# Patient Record
Sex: MALE | Race: OTHER | NOT HISPANIC OR LATINO | URBAN - METROPOLITAN AREA
[De-identification: names, ages, dates, MRNs, and addresses within clinical notes are randomized per-mention and may not be internally consistent; named-entity substitution may affect disease eponyms.]

---

## 2022-05-24 ENCOUNTER — EMERGENCY (EMERGENCY)
Facility: HOSPITAL | Age: 42
LOS: 1 days | Discharge: HOME | End: 2022-05-24
Attending: EMERGENCY MEDICINE
Payer: SELF-PAY

## 2022-05-24 VITALS
TEMPERATURE: 98 F | HEIGHT: 69 IN | WEIGHT: 199.96 LBS | OXYGEN SATURATION: 97 % | DIASTOLIC BLOOD PRESSURE: 76 MMHG | SYSTOLIC BLOOD PRESSURE: 122 MMHG | HEART RATE: 65 BPM | RESPIRATION RATE: 16 BRPM

## 2022-05-24 LAB
ALBUMIN SERPL ELPH-MCNC: 3.8 G/DL — SIGNIFICANT CHANGE UP (ref 3.5–5)
ALP SERPL-CCNC: 49 U/L — SIGNIFICANT CHANGE UP (ref 40–120)
ALT FLD-CCNC: 27 U/L DA — SIGNIFICANT CHANGE UP (ref 10–60)
ANION GAP SERPL CALC-SCNC: 5 MMOL/L — SIGNIFICANT CHANGE UP (ref 5–17)
AST SERPL-CCNC: 17 U/L — SIGNIFICANT CHANGE UP (ref 10–40)
BASOPHILS # BLD AUTO: 0.03 K/UL — SIGNIFICANT CHANGE UP (ref 0–0.2)
BASOPHILS NFR BLD AUTO: 0.4 % — SIGNIFICANT CHANGE UP (ref 0–2)
BILIRUB SERPL-MCNC: 0.4 MG/DL — SIGNIFICANT CHANGE UP (ref 0.2–1.2)
BUN SERPL-MCNC: 15 MG/DL — SIGNIFICANT CHANGE UP (ref 7–18)
CALCIUM SERPL-MCNC: 9 MG/DL — SIGNIFICANT CHANGE UP (ref 8.4–10.5)
CHLORIDE SERPL-SCNC: 109 MMOL/L — HIGH (ref 96–108)
CO2 SERPL-SCNC: 26 MMOL/L — SIGNIFICANT CHANGE UP (ref 22–31)
CREAT SERPL-MCNC: 0.81 MG/DL — SIGNIFICANT CHANGE UP (ref 0.5–1.3)
EGFR: 115 ML/MIN/1.73M2 — SIGNIFICANT CHANGE UP
EOSINOPHIL # BLD AUTO: 0.35 K/UL — SIGNIFICANT CHANGE UP (ref 0–0.5)
EOSINOPHIL NFR BLD AUTO: 4.9 % — SIGNIFICANT CHANGE UP (ref 0–6)
GLUCOSE SERPL-MCNC: 104 MG/DL — HIGH (ref 70–99)
HCT VFR BLD CALC: 42.7 % — SIGNIFICANT CHANGE UP (ref 39–50)
HGB BLD-MCNC: 14.3 G/DL — SIGNIFICANT CHANGE UP (ref 13–17)
IMM GRANULOCYTES NFR BLD AUTO: 0.3 % — SIGNIFICANT CHANGE UP (ref 0–1.5)
LYMPHOCYTES # BLD AUTO: 2.2 K/UL — SIGNIFICANT CHANGE UP (ref 1–3.3)
LYMPHOCYTES # BLD AUTO: 31 % — SIGNIFICANT CHANGE UP (ref 13–44)
MCHC RBC-ENTMCNC: 30 PG — SIGNIFICANT CHANGE UP (ref 27–34)
MCHC RBC-ENTMCNC: 33.5 GM/DL — SIGNIFICANT CHANGE UP (ref 32–36)
MCV RBC AUTO: 89.7 FL — SIGNIFICANT CHANGE UP (ref 80–100)
MONOCYTES # BLD AUTO: 0.71 K/UL — SIGNIFICANT CHANGE UP (ref 0–0.9)
MONOCYTES NFR BLD AUTO: 10 % — SIGNIFICANT CHANGE UP (ref 2–14)
NEUTROPHILS # BLD AUTO: 3.78 K/UL — SIGNIFICANT CHANGE UP (ref 1.8–7.4)
NEUTROPHILS NFR BLD AUTO: 53.4 % — SIGNIFICANT CHANGE UP (ref 43–77)
NRBC # BLD: 0 /100 WBCS — SIGNIFICANT CHANGE UP (ref 0–0)
PLATELET # BLD AUTO: 190 K/UL — SIGNIFICANT CHANGE UP (ref 150–400)
POTASSIUM SERPL-MCNC: 4 MMOL/L — SIGNIFICANT CHANGE UP (ref 3.5–5.3)
POTASSIUM SERPL-SCNC: 4 MMOL/L — SIGNIFICANT CHANGE UP (ref 3.5–5.3)
PROT SERPL-MCNC: 7.8 G/DL — SIGNIFICANT CHANGE UP (ref 6–8.3)
RBC # BLD: 4.76 M/UL — SIGNIFICANT CHANGE UP (ref 4.2–5.8)
RBC # FLD: 12.5 % — SIGNIFICANT CHANGE UP (ref 10.3–14.5)
SODIUM SERPL-SCNC: 140 MMOL/L — SIGNIFICANT CHANGE UP (ref 135–145)
WBC # BLD: 7.09 K/UL — SIGNIFICANT CHANGE UP (ref 3.8–10.5)
WBC # FLD AUTO: 7.09 K/UL — SIGNIFICANT CHANGE UP (ref 3.8–10.5)

## 2022-05-24 PROCEDURE — 96365 THER/PROPH/DIAG IV INF INIT: CPT

## 2022-05-24 PROCEDURE — 99284 EMERGENCY DEPT VISIT MOD MDM: CPT | Mod: 25

## 2022-05-24 PROCEDURE — 80053 COMPREHEN METABOLIC PANEL: CPT

## 2022-05-24 PROCEDURE — 85025 COMPLETE CBC W/AUTO DIFF WBC: CPT

## 2022-05-24 PROCEDURE — 99284 EMERGENCY DEPT VISIT MOD MDM: CPT

## 2022-05-24 PROCEDURE — 36415 COLL VENOUS BLD VENIPUNCTURE: CPT

## 2022-05-24 RX ORDER — SODIUM CHLORIDE 9 MG/ML
3 INJECTION INTRAMUSCULAR; INTRAVENOUS; SUBCUTANEOUS ONCE
Refills: 0 | Status: COMPLETED | OUTPATIENT
Start: 2022-05-24 | End: 2022-05-24

## 2022-05-24 RX ORDER — IBUPROFEN 200 MG
1 TABLET ORAL
Qty: 28 | Refills: 0
Start: 2022-05-24 | End: 2022-05-30

## 2022-05-24 RX ADMIN — SODIUM CHLORIDE 3 MILLILITER(S): 9 INJECTION INTRAMUSCULAR; INTRAVENOUS; SUBCUTANEOUS at 21:57

## 2022-05-24 RX ADMIN — Medication 100 MILLIGRAM(S): at 21:52

## 2022-05-24 RX ADMIN — Medication 600 MILLIGRAM(S): at 22:22

## 2022-05-24 NOTE — ED PROVIDER NOTE - PHYSICAL EXAMINATION
Chest/Heart: chest clear, heart regular rhythm     Abdomen: abdomen soft   Skin: cellulitis, erythematous and induration, over the left tibia measures 5x10 cm, no streaking, no signs of lymphangitis

## 2022-05-24 NOTE — ED PROVIDER NOTE - CARE PLAN
Anesthesia Anesthesia Anesthesia Anesthesia OB OB OB OB OB OB OB OB 1 Principal Discharge DX:	Cellulitis

## 2022-05-24 NOTE — ED PROVIDER NOTE - PATIENT PORTAL LINK FT
You can access the FollowMyHealth Patient Portal offered by [facility] by registering at the following website: [email or web address]. By joining [employer]’s FollowMyHealth portal, you will also be able to view your health information using other applications (apps) compatible with our system.

## 2022-05-24 NOTE — ED ADULT NURSE NOTE - TEMPLATE
You may be receiving a survey by text or [employer]. If you do, we would appreciate your honest feedback. This information comes back to the clinic so it can be used to improve patient experience and quality. Your opinion is important to us. Thank you!      Reachoo  Navigate local mental health and substance abuse services.  Take an anonymous mental health screening.  Search and online library of resources.    If imaging or laboratory studies were ordered at your visit today, please read the following:    Most of these laboratory test results will be back within 24 to 48 hours.    All imaging test results need to be read by a radiologist. Most results will be back within 48 to 72 hours.    Please allow 5 to 7 business days to hear from our office. Once [facility] has reviewed these results, the office will contact you either by phone, letter, or My Lety to discuss recommendations.     Please arrive 10-minutes early for all scheduled appointments. We strive to respect the time of all patients. If you arrive late, you may be asked to reschedule your appointment.    Patient: Benjamin CED Malagon  Clinician: ASHUTOSH Silver      [facility] Services Description     [x]    Cost Assessment Pharmacy will review therapeutic alternatives and apply coupons where appropriate     []  Automatic Refill   Pharmacy will assist patient in signing up for automatic refill      []  1:1 Patient  Appointment Pharmacy will schedule a patient appointment to review patient’s medications, provide education, and answer questions     []  Medication  Bubble  Packaging Pharmacy will presort and prepackage medications by dose, time of day and day of the week     []  Immunization Assessment* Pharmacy will assess patient for updated immunizations. Pharmacist will provide as appropriate*     []  Home Delivery Pharmacy will assist patient in signing up for home delivery service     []  Sharps container* ($3.99 initial cost) Pharmacy will  provide initial sharps container for $3.99* Disposal and replacement will be free.     *Costs per insurance copay or as specified. Other services are free of charge.    [facility] locations can be found at [email or web address]    -------------------------------------------------------------------------------------------------------------------------------------------------------------------------------------------------------     No cyanosis, no pallor, no jaundice, no rash Wounds

## 2022-05-24 NOTE — ED PROVIDER NOTE - NSFOLLOWUPCLINICS_GEN_ALL_ED_FT
[facility]  Internal Medicine  [address] [place] [address]  Phone: [phone]  Fax: [phone]

## 2022-05-24 NOTE — ED PROVIDER NOTE - SKIN LOCATION #1
leg
I have reviewed and confirmed nurses' notes for patient's medications, allergies, medical history, and surgical history.

## 2022-05-24 NOTE — ED PROVIDER NOTE - OBJECTIVE STATEMENT
40 y/o male complaining of rash that developed on Sunday on his left leg that became painful. Patient also noted to have a fever and chills on Sunday. No medical problems. No diabetes, no smoking, no alcohol, no history of trauma. Patient just developed this rash. Patient also states that he had pain to the groin. No known drug allergies.

## 2022-05-24 NOTE — ED PROVIDER NOTE - NSFOLLOWUPINSTRUCTIONS_ED_ALL_ED_FT
[facility]                                                                                                                                                                                                          Celulitis, en adultos    Cellulitis, Adult       La celulitis es radha infección de la piel. La ina infectada generalmente está caliente, de color treviño, hinchada y duele. Esta afección ocurre con más frecuencia en los brazos y en la parte inferior de las piernas. La infección puede diseminarse al tejido subyacente, los músculos y la maria antonia, y volverse grave. Es importante realizar un tratamiento para esta afección.      ¿Cuáles son las causas?    La celulitis está causada por bacterias. Las bacterias ingresan a través de radha lesión cutánea, por ejemplo, un david, radha quemadura, radha picadura de insecto, radha llaga abierta o radha grieta.      ¿Qué incrementa el riesgo?    Es más probable que esta afección se manifieste en personas que:  •Tienen debilitado el sistema de defensa del organismo (sistema inmunitario).      •Tienen heridas abiertas en la piel, mare bernard, quemaduras, picaduras y rasguños. Las bacterias pueden entrar al cuerpo a través de estas heridas abiertas.      •Son mayores de 60 años de edad.      •Tienen diabetes.      •Tienen un tipo de enfermedad hepática de larga duración (crónica) o enfermedad renal (cirrosis).      •Tienen obesidad.    •Tienen radha afección en la piel, por ejemplo:  •Urticaria que pica (eczema).      •Movimiento lento de la maria antonia en las venas (estasis venosa).      •Acumulación de líquido debajo de la piel (edema).        •Dhillon recibido radioterapia.      •Consumen drogas por vía intravenosa.        ¿Cuáles son los signos o los síntomas?    Los síntomas de esta afección incluyen los siguientes:  •Enrojecimiento, estrías o manchas en la piel.      •Ina de la piel hinchada.      •Dolor o sensibilidad al tacto en radha ina de la piel.      •Calor en la piel.      •Fiebre.      •Escalofríos.      •Ampollas.        ¿Cómo se diagnostica?    Esta afección se diagnostica en función de los antecedentes médicos y un examen físico. También pueden hacerle exámenes, que incluyen los siguientes:  •Análisis de maria antonia.      •Estudios de diagnóstico por imágenes.        ¿Cómo se trata?    El tratamiento de esta afección puede incluir lo siguiente:  •Medicamentos, mare antibióticos o medicamentos para tratar alergias (antihistamínicos).      •Tratamiento complementario, mare descanso y aplicación de paños fríos o tibios (compresas) en la piel.      •Hospitalización, si la afección es grave.      Por lo general, la infección comienza a mejorar en 1 o 2 días de tratamiento.      Siga estas indicaciones en martinez casa:     Medicamentos     •[place] los medicamentos de venta tonia y los recetados solamente mare se lo haya indicado el médico.      •Si le recetaron un antibiótico, tómelo mare se lo haya indicado el médico. No deje de curly el antibiótico, aunque comience a sentirse mejor.      Indicaciones generales     •Koki suficiente líquido mare para mantener la orina de color amarillo pálido.      • No toque ni frote la ina infectada.      •Cuando esté sentado o acostado, levante (eleve) la ina infectada por encima del nivel del corazón.      •Aplique compresas frías o tibias en la ina afectada mare se lo haya indicado el médico.      •Concurra a todas las visitas de seguimiento mare se lo haya indicado el médico. [place] es importante. En estas visitas, el médico puede asegurarse de que no se desarrolla radha infección más grave.        Comuníquese con un médico si:    •Tiene fiebre.      •Los síntomas no empiezan a mejorar en el plazo de 1 o 2 días después de comenzar el tratamiento.      •El hueso o la articulación que se encuentran por debajo de la ina infectada le duelen después de que la piel se meghan.      •La infección se repite en la misma ina o en radha ina diferente.      •Tiene radha protuberancia inflamada en la ina infectada.      •Tiene nuevos síntomas.      •Se siente enfermo (malestar general) con kirit y molestias musculares.        Solicite ayuda inmediatamente si:    •Valerio síntomas empeoran.      •Se siente muy somnoliento.      •Tiene vómitos o diarrea que no desaparecen.      •Observa radha línea bishop en la piel que sale desde la ina infectada.      •La ina bishop se extiende o se vuelve de color oscuro.      Estos síntomas pueden representar un problema grave que constituye radha emergencia. No espere a ghislaine si los síntomas desaparecen. Solicite atención médica de inmediato. Comuníquese con el servicio de emergencias de martinez localidad (911 en los [place]). No conduzca por valerio propios medios hasta el hospital.       Resumen    •La celulitis es radha infección de la piel. Esta afección ocurre con más frecuencia en los brazos y en la parte inferior de las piernas.      •El tratamiento de esta afección puede incluir medicamentos, mare antibióticos o antihistamínicos.      •[place] los medicamentos de venta tonia y los recetados solamente mare se lo haya indicado el médico. Si le recetaron un antibiótico, no deje de tomarlo aunque comience a sentirse mejor.      •Comuníquese con un médico si valerio síntomas no empiezan a mejorar en el plazo de 1 o 2 días después de comenzar el tratamiento o si valerio síntomas empeoran.      •Concurra a todas las visitas de seguimiento mare se lo haya indicado el médico. [place] es importante. En estas visitas, el médico puede asegurarse de que no se está desarrollando radha infección más grave.      Esta información no tiene mare fin reemplazar el consejo del médico. Asegúrese de hacerle al médico cualquier pregunta que tenga.      Document Revised: 06/25/2019 Document Reviewed: 06/25/2019    Elsevier Patient Education © 2022 [employer]